# Patient Record
Sex: MALE | Race: WHITE | NOT HISPANIC OR LATINO
[De-identification: names, ages, dates, MRNs, and addresses within clinical notes are randomized per-mention and may not be internally consistent; named-entity substitution may affect disease eponyms.]

---

## 2020-09-23 ENCOUNTER — APPOINTMENT (OUTPATIENT)
Dept: ORTHOPEDIC SURGERY | Facility: CLINIC | Age: 25
End: 2020-09-23
Payer: COMMERCIAL

## 2020-09-23 VITALS
HEART RATE: 66 BPM | SYSTOLIC BLOOD PRESSURE: 118 MMHG | HEIGHT: 78 IN | BODY MASS INDEX: 28.93 KG/M2 | DIASTOLIC BLOOD PRESSURE: 76 MMHG | WEIGHT: 250 LBS

## 2020-09-23 DIAGNOSIS — Z78.9 OTHER SPECIFIED HEALTH STATUS: ICD-10-CM

## 2020-09-23 PROBLEM — Z00.00 ENCOUNTER FOR PREVENTIVE HEALTH EXAMINATION: Status: ACTIVE | Noted: 2020-09-23

## 2020-09-23 PROCEDURE — 99203 OFFICE O/P NEW LOW 30 MIN: CPT

## 2020-09-23 PROCEDURE — 73564 X-RAY EXAM KNEE 4 OR MORE: CPT | Mod: RT

## 2020-09-23 RX ORDER — IBUPROFEN 200 MG/1
200 TABLET, COATED ORAL
Refills: 0 | Status: ACTIVE | COMMUNITY

## 2020-09-23 NOTE — ASSESSMENT
[FreeTextEntry1] : 25-year-old with severe RIGHT knee pain in the last day and that seems to be getting better now. He had taken some ibuprofen. There wasn't a specific injury.Given his exam at the time of that visit I could not identify a clear etiology for his pain. There is no swelling in the joint and there is good motion in the knee feels stable. We'll have to see if he has a recurrent episode of pain. If he has any ongoing Pain or if he has repeated episodes of this severe pain then he should get an MRI to evaluate further Sometimes acute pain may just be from some patella maltracking or an acute inflammation that resolves.\par If he gets MRI I will call him.\par We also talked about running. He is relatively large, tall to be a runner. He should do cross training and biking ideally. At recommended doing more strengthening exercises in the lower extremity and some stretching. He was given a prescription to do physical therapy. He can take some ibuprofen and ice for the next few days. If the pain is ongoing he should get the MRI and I will call him with the results.

## 2020-09-23 NOTE — HISTORY OF PRESENT ILLNESS
[de-identified] : Leonel is a 25-year-old who presents with pain in his RIGHT knee He's had some very minor low grade discomfort associated with running and his knee that may be less than a 1/10. Then he woke up yesterday with pain that was a 4/10 in his knee on the lateral side. Pain got progressively worse and he had some difficulty walking. He went to work and was active through the day and it had episodes where it became much more severe. He was able to drive home but then could hardly walk. Keeping his knees straight seemed to make it feel a little better. He had difficulty bending the knee. He took 2 Advil last night and then another 2 this morning and his knee is improving. When he went to bed last night the pain was an 8-9/10 but then this morning it was a 4/10 and right now he just has some mild discomfort.\par no prior knee injuries. There was no particular injury that occurred with his knee.

## 2020-09-23 NOTE — PHYSICAL EXAM
[LE] : Sensory: Intact in bilateral lower extremities [DP] : dorsalis pedis 2+ and symmetric bilaterally [PT] : posterior tibial 2+ and symmetric bilaterally [Normal RLE] : Right Lower Extremity: No scars, rashes, lesions, ulcers, skin intact [Normal LLE] : Left Lower Extremity: No scars, rashes, lesions, ulcers, skin intact [Normal Touch] : sensation intact for touch [Normal] : Gait: normal [Obese] : not obese [de-identified] : Knees:\par Nonantalgic gait. he can squat fully and just feels a slight anterolateral knee discomfort on the RIGHT\par No effusion.\par - erythema, edema, warmth.\par No significant tenderness joint lines her patella facets were along the ITB.\par ROM: 0 degrees extension to 135 degrees flexion. Slight pain RIGHT knee on full flexion. No crepitus\par - Eriberto.\par 1A Lachman.  - Pivot shift. - posterior drawer. Normal rotational, varus/valgus laxity.\par Intact extensor mechanism.\par NVI distally.\par  [de-identified] : mildly overweight with a BMI of 29 [de-identified] : No respiratory distress or cough  [de-identified] : \par x-rays knee weightbearing 4 views weightbearing today show no acute changes such as fractures. No osteoarthritis. There is a very small oval density on the lateral tibial plateau of uncertain significance just seen on the AP views. No loose bodies

## 2020-10-12 ENCOUNTER — APPOINTMENT (OUTPATIENT)
Dept: ORTHOPEDIC SURGERY | Facility: CLINIC | Age: 25
End: 2020-10-12
Payer: COMMERCIAL

## 2020-10-12 PROCEDURE — 73560 X-RAY EXAM OF KNEE 1 OR 2: CPT | Mod: RT

## 2020-10-12 PROCEDURE — 99214 OFFICE O/P EST MOD 30 MIN: CPT

## 2020-10-12 NOTE — PHYSICAL EXAM
[Slightly Antalgic] : slightly antalgic [Obese] : not obese [de-identified] : Knees:\par he can stand and walk a few steps without any significant pain. \par No effusion.\par - erythema, edema, warmth.\par there is mild tenderness mid lateral tibial plateau but not anterior. No joint line tenderness laterally or medially.\par ROM: 0 degrees extension to 130 degrees flexion. Slight pain RIGHT knee on full flexion. No crepitus\par - Eriberto.\par 1A Lachman.  - Pivot shift. - posterior drawer. Normal rotational, varus/valgus laxity.\par Intact extensor mechanism.\par NVI distally.\par  [de-identified] : mildly overweight with a BMI of 29 [de-identified] : No respiratory distress or cough  [de-identified] : \par MRI of the RIGHT knee performed October 9, 2020 shows extensive fluid in the lateral aspect of the tibial plateau diffusely. There is an area in the central portion of the tibial plateau that looks like a possible 3 mm osteochondral lesion. there is a 3 mm hypointense signal centrally which did correspond to the x-ray finding from 2 weeks ago with a dense lesion seen in the lateral tibial plateau At that time the significance of this was not appreciated that may be the area of a small osteochondral lesion\par No definite meniscus tear but there is some partial signal in the very posterior horn of the lateral meniscus and intrasubstance signal in the medial meniscus.No ligament tears\par \par Repeat AP and lateral x-rays today of the RIGHT knee no longer showed that the radiodense lesion in the lateral tibial plateau seen initially and what seem to correspond to the lesion seen on the MRI. Views  are quite similar

## 2020-10-12 NOTE — ASSESSMENT
[FreeTextEntry1] : 25-year-old with RIGHT knee pain the variable degree over the last few weeks that got much worse again at the end of last week. MRI shows a stress fracture/insufficiency fracture in the lateral tibial plateau region. There appears to be a small, 3mm, osteochondral lesion Central tibial plateau.  This corresponded to a radiodense lesion seen on the x-ray last visit but is not seen on x-rays today.His is unusual with his history and age. There was no trauma. There was no sudden increase in activity.\par given Shannon's usual his history is I've recommended getting a DEXA scan to rule out osteoporosis which seems quite unlikely in someone young and healthy like himself. I will call him with the results.\par he should do straight leg raises/non-weightbearing exercise but no weightbearing exercise right now and limit the walking and always use crutches or a walker.He does not need to take any ibuprofen or medication which may just mask his pain. He should followup in about 3 weeks to check on his progress.

## 2020-10-12 NOTE — HISTORY OF PRESENT ILLNESS
[de-identified] : Leonel comes in for followup for his RIGHT knee. It's been painful on and off for the last several weeks after I saw him 2 weeks ago when it was feeling better. It got much worse and he went for the MRI scan on Friday which showed an insufficiency/Stress fracture in the lateral tibial plateau.\par I spoke to him on the phone Friday. He was using a walker and I suggested that he come in and continue resting. He again denied any history of trauma. He had been running up to about a 3 mile run a few weeks before the pain started. He had no run within 2 weeks of developing the pain so it didn't seem like that caused the pain. When running he would occasionally have a little bit of very mild pain in either knee perhaps a little more on the RIGHT than LEFT. He never had any twisting injuries or falls.\par He's never had a fracture before.\par His knee pain today is much less than it was 3 days ago. The rest over the weekend and not walking very much and using a walker really helped. He came to the office without the walker but just walked out from the car.\par There is no swelling or locking or buckling. He had taken some ibuprofen.

## 2020-10-12 NOTE — PROCEDURE
[de-identified] : \par He was fitted for her crutches and shown how deep the walking partial weightbearing

## 2020-11-02 ENCOUNTER — APPOINTMENT (OUTPATIENT)
Dept: ORTHOPEDIC SURGERY | Facility: CLINIC | Age: 25
End: 2020-11-02
Payer: COMMERCIAL

## 2020-11-02 PROCEDURE — 99072 ADDL SUPL MATRL&STAF TM PHE: CPT

## 2020-11-02 PROCEDURE — 99214 OFFICE O/P EST MOD 30 MIN: CPT

## 2020-11-02 PROCEDURE — 73560 X-RAY EXAM OF KNEE 1 OR 2: CPT | Mod: RT

## 2020-11-02 NOTE — HISTORY OF PRESENT ILLNESS
[de-identified] : Leonel comes in for followup for his RIGHT knee.  He used crutches and wore the brace for a couple weeks and then stopped because his knee was feeling progressively better. Saline taking it easy and not walking anywhere of any significance. 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 He is getting  this weekend and would like to be able to dance. He is wondering if his knee is healed yet or not.\par He did not go for a bone density test which was ordered last visit. He's been taking vitamin D.

## 2020-11-02 NOTE — PHYSICAL EXAM
[Normal] : Gait: normal [Obese] : not obese [de-identified] : Knees:\par He can walk short distances the examination room without pain or difficulty \par No effusion.\par - erythema, edema, warmth.\par Nontender joint line medial and lateral. Nontender patella facets. Nontender tibia\par ROM: 0 degrees extension to 135 degrees flexion no longer with pain RIGHT knee on full flexion. No crepitus\par - Eriberto.\par 1A Lachman.  - Pivot shift. - posterior drawer. Normal rotational, varus/valgus laxity.\par Intact extensor mechanism.\par NVI distally.\par  [de-identified] : mildly overweight with a BMI of 29 [de-identified] : No respiratory distress or cough  [de-identified] : \par MRI of the RIGHT knee performed October 9, 2020 shows extensive fluid in the lateral aspect of the tibial plateau diffusely. There is an area in the central portion of the tibial plateau that looks like a possible 3 mm osteochondral lesion. there is a 3 mm hypointense signal centrally which did correspond to the x-ray finding from 2 weeks ago with a dense lesion seen in the lateral tibial plateau At that time the significance of this was not appreciated that may be the area of a small osteochondral lesion\par No definite meniscus tear but there is some partial signal in the very posterior horn of the lateral meniscus and intrasubstance signal in the medial meniscus.No ligament tears\par \par Repeat AP right knee x-ray today is unremarkable and no longer shows that the radiodense lesion in the lateral tibial plateau seen on initial x-ray

## 2020-11-02 NOTE — ASSESSMENT
[FreeTextEntry1] : 25-year-old with RIGHT knee pain with MRI showing a stress fracture/insufficiency fracture in the lateral tibial plateau region. There appears to be a small, 3 mm, osteochondral lesion central tibial plateau.  \par clinically his knee is improving. Given that it's only been 3 weeks and given the extent of the edema in the lateral tibia, I would assume that his knee has not healed to the degree that I would allow him to do any impact exercises, running, jumping or dancing. He can ride a bike on no work very low assistance. He should do straight leg raises, nonweightbearing exercises.\par He should get a bone density test, DEXA scan before the next visit. I was concerned about the extent of the insufficiency fracture given the lack of any history of trauma and his young age.\par He is not having any mechanical symptoms in his knee at this time. There may be a 3 mm chondral injury.\par In order to determine if there is any healing occurring and if the bone marrow edema has resolved I referred him for a followup MRI.\par Followup in 3-4 weeks.

## 2020-12-01 PROBLEM — M94.9 CHONDRAL LESION: Status: ACTIVE | Noted: 2020-10-12

## 2020-12-01 PROBLEM — M84.469D INSUFFICIENCY FRACTURE OF TIBIA WITH ROUTINE HEALING, SUBSEQUENT ENCOUNTER: Status: ACTIVE | Noted: 2020-10-12

## 2020-12-02 ENCOUNTER — APPOINTMENT (OUTPATIENT)
Dept: ORTHOPEDIC SURGERY | Facility: CLINIC | Age: 25
End: 2020-12-02
Payer: COMMERCIAL

## 2020-12-02 DIAGNOSIS — M25.561 PAIN IN RIGHT KNEE: ICD-10-CM

## 2020-12-02 DIAGNOSIS — M84.469D PATHOLOGICAL FRACTURE, UNSPECIFIED TIBIA AND FIBULA, SUBSEQUENT ENCOUNTER FOR FRACTURE WITH ROUTINE HEALING: ICD-10-CM

## 2020-12-02 DIAGNOSIS — M25.562 PAIN IN LEFT KNEE: ICD-10-CM

## 2020-12-02 DIAGNOSIS — M94.9 DISORDER OF CARTILAGE, UNSPECIFIED: ICD-10-CM

## 2020-12-02 PROCEDURE — 99072 ADDL SUPL MATRL&STAF TM PHE: CPT

## 2020-12-02 PROCEDURE — 99214 OFFICE O/P EST MOD 30 MIN: CPT

## 2020-12-02 NOTE — PHYSICAL EXAM
Daytime:  (do not instill while wearing contact lenses)  Soothe XP  Systane Balance  Refresh Optive Advanced    During contact lens wear:  Systane Contacts  Refresh Contacts     Night time-before bed:  Drops:  Refresh Liquigel  Systane Gel Drops  Ointment:  Soothe Night Time ointment  Systane Nighttime ointment  Refresh PM ointment       Thermalon Dry Eye Compress    The Thermalon dry eye compress is designed to offer a soothing solution to dry eye problems without messy washcloths or dripping water. These compresses work by applying microwave-activated moist heat to the eyes which refreshes them and relieves some of the discomfort that comes alongside this condition. Many experts recommend the Thermalon as a natural and helpful way to offer relief from dry eyes. All you need to do to use this product is place it in the microwave oven for the recommended time and apply. The warmth of the compress assists in unplugging blocked oil glands, freeing some of the oil in the glands to slow down evaporative tear loss.  This compress is reusable and washable. It works because it contains a patented substance known as Hydro Pearls, which have the job of continuously absorbing and storing water molecules from the air. When this product is placed in the microwave oven, it is encouraged to release hygienic and clean moist heat that works perfectly for dry eye syndrome. There’s no need to add any water, which means that mess can be avoided, and fast acting dry eye relief can be achieved.  The Thermalon dry eye compress makes treating dry eye syndrome convenient and simple for many people, as it heats quickly within a standard microwave and provides soothing heat to blocked glands that help to keep your eyes hydrated and refreshed without the use of medication. It is recommended that you don’t attempt to heat the pack for more than 30 seconds, as 15 seconds of heat is often enough for most microwave ovens.  One of the most  significant benefits of the Thermalon compress is that it is regarded to be one of the most effective and safe solutions on the market today. The pearls within the dry eye compress will not support the growth of fungi and bacteria like other herbal and grain products that can be found in some stores, and they are often easier to use than gel-related products. It’s recommended that your Thermalon compress be replaced by a new one every six months if you use it regularly.  Available at:     [facility], [address] [place] [address] [id number] [place] [address]     Dry Eyes     When you have a lack of tears, this is known as dry eyes. Your eyes need tears to keep them lubricated and to help wash away foreign particles. Burning, scratching, stinging, or excessive tearing are all signs of dry eyes.       Common Causes:  · Aging  · Dry environment  · Smoking/second-hand smoke exposure  · Eye injuries and other eye problems  · Sjogren’s syndrome  · Cold and allergy medications  · Previous eye surgery     You can try using over the counter artificial tears. Thicker drops last longer, but can cause blurry vision for a short time after instillation.  Using the artificial tears on a regular basis, instead of using then just as symptoms arise, will provide the most benefit.  Try to avoid smoke and direct wind. Use a humidifier, especially in winter. When possible, stop allergy or cold medicines if that is determined to be causing your symptoms.       Please feel free to call our office,     Elton Walker, OD  [phone]        [Slightly Antalgic] : slightly antalgic [LE] : Sensory: Intact in bilateral lower extremities [DP] : dorsalis pedis 2+ and symmetric bilaterally [PT] : posterior tibial 2+ and symmetric bilaterally [Normal RLE] : Right Lower Extremity: No scars, rashes, lesions, ulcers, skin intact [Normal LLE] : Left Lower Extremity: No scars, rashes, lesions, ulcers, skin intact [Normal Touch] : sensation intact for touch [Normal] : Oriented to person, place, and time, insight and judgement were intact and the affect was normal [Obese] : not obese [de-identified] : Knees:\par nonantalgic gait. He can squat without pain. Normal alignment\par No effusion.\par - erythema, edema, warmth.\par Nontender joint line medial and lateral. Nontender patella facets. Nontender tibia\par ROM: 0 degrees extension to 135 degrees flexion  without pain. No crepitus\par - Eriberto.\par 1A Lachman.  - Pivot shift. - posterior drawer. Normal rotational, varus/valgus laxity.\par Intact extensor mechanism.\par NVI distally.\par  [de-identified] : mildly overweight with a BMI of 29 [de-identified] : No respiratory distress or cough  [de-identified] : \par No new x-rays today. If he has ongoing pain in his LEFT knee next visit we will get x-rays but the exam was completely unremarkable\par \par MRI of the RIGHT knee performed October 9, 2020 shows extensive fluid in the lateral aspect of the tibial plateau diffusely. There is an area in the central portion of the tibial plateau that looks like a possible 3 mm osteochondral lesion. there is a 3 mm hypointense signal centrally which did correspond to the x-ray finding from 2 weeks ago with a dense lesion seen in the lateral tibial plateau At that time the significance of this was not appreciated that may be the area of a small osteochondral lesion\par No definite meniscus tear but there is some partial signal in the very posterior horn of the lateral meniscus and intrasubstance signal in the medial meniscus.No ligament tears\par \par Repeat AP right knee x-ray 11/2/20 was unremarkable and no longer shows that the radiodense lesion in the lateral tibial plateau seen on initial x-ray

## 2020-12-02 NOTE — HISTORY OF PRESENT ILLNESS
[de-identified] : Leonel comes in for followup for his RIGHT knee which is feeling very good. \par He has not been having any pain in his knee at all in the last month. He did get . He just did a little bit of dancing. On a few occasions he jogged a few steps without pain but has really taken it easy. He hasn't been doing any of the physical therapy exercises. No swelling or locking or buckling in the knee.\par He did not go for a bone density test which was ordered last visit. He's been taking vitamin D.\par He did get a little bit of pain in his contralateral LEFT knee recently but nothing like the sharp pains he was having on the RIGHT side. No swelling.

## 2020-12-02 NOTE — ASSESSMENT
[FreeTextEntry1] : 25-year-old with RIGHT knee pain with MRI showing a stress fracture/insufficiency fracture in the lateral tibial plateau region. There appeared to be a small, 3 mm, osteochondral lesion central tibial plateau.  \par clinically his knee is improving. \par At this point he hasn't had any pain in about a month. He has a little bit of intermittent soreness in his contralateral knee which may be patellofemoral and compensatory. If that gets worse we will work it up further with x-rays and an MRI if needed.\par If the pain in the RIGHT knee comes back we will get an MRI to make sure the insufficiency fracture on 10 chondral injury has healed.\par He will resume physical therapy and we've reviewed exercises that he should be doing to strengthen around both knees. Slowly increase activity as tolerated. Stationary bike or elliptical would be good for exercise as opposed to running right now.\par Followup if he has any recurrent pain or issues.

## 2022-01-12 ENCOUNTER — RESULT REVIEW (OUTPATIENT)
Age: 27
End: 2022-01-12

## 2022-01-12 ENCOUNTER — OUTPATIENT (OUTPATIENT)
Dept: OUTPATIENT SERVICES | Facility: HOSPITAL | Age: 27
LOS: 1 days | End: 2022-01-12
Payer: COMMERCIAL

## 2022-01-12 ENCOUNTER — APPOINTMENT (OUTPATIENT)
Dept: ORTHOPEDIC SURGERY | Facility: CLINIC | Age: 27
End: 2022-01-12
Payer: COMMERCIAL

## 2022-01-12 VITALS
HEIGHT: 78 IN | WEIGHT: 245 LBS | DIASTOLIC BLOOD PRESSURE: 80 MMHG | SYSTOLIC BLOOD PRESSURE: 120 MMHG | BODY MASS INDEX: 28.35 KG/M2

## 2022-01-12 DIAGNOSIS — R26.81 UNSTEADINESS ON FEET: ICD-10-CM

## 2022-01-12 DIAGNOSIS — R20.2 PARESTHESIA OF SKIN: ICD-10-CM

## 2022-01-12 PROCEDURE — 72082 X-RAY EXAM ENTIRE SPI 2/3 VW: CPT

## 2022-01-12 PROCEDURE — 72100 X-RAY EXAM L-S SPINE 2/3 VWS: CPT

## 2022-01-12 PROCEDURE — 99214 OFFICE O/P EST MOD 30 MIN: CPT

## 2022-01-12 PROCEDURE — 72084 X-RAY EXAM ENTIRE SPI 6/> VW: CPT | Mod: 26

## 2022-01-12 PROCEDURE — 72084 X-RAY EXAM ENTIRE SPI 6/> VW: CPT

## 2022-01-12 NOTE — DISCUSSION/SUMMARY
[de-identified] : DIscussed the results of the patient's history, physical exam, and imaging. L4/5 disc herniation with back and leg parasthesias.  leg parasthesias resolved, back pain much improved.  We discussed the importance of improving core strength and flexibility, and a prescription for physical therapy was given. Spoke to patient extensively about exercise, avoiding heavy lifting and exercises that put pressure on lower back. Cervical MRI script ordered for balance problems and hand numbness. The patient will follow up with me virtually after MRI. All questions were answered.

## 2022-01-12 NOTE — HISTORY OF PRESENT ILLNESS
[de-identified] : Mr. BAKER is a very pleasant 26 year old male who complains of LLQ and RLQ abdominal / bilateral leg numbness x 1 months. Numbness has been gradually improving, has recently begun exercise again. Feels 85% better today than 1 month ago. Reports 1 experience in which pt had urge to defecate and due to numbness could not reach restroom in time. Denies neck pain, shooting pain down extremities. Denies falls. Reports occasional pinky finger numbness.\par \par The patient reports no loss of hand dexterity.\par The patient states there is gait instability.\par The patient reports no difficulty with urination.\par \par The patient reports no history of previous spine surgery.\par \par The patient has no history of unexpected weight loss, no history of active cancer, no history bladder or bowel dysfunction, no night pain, no fevers or chills.\par \par The past medical history, surgical history, family history, allergies, medications, review of systems, family history and social history were reviewed and non-contributory.\par

## 2022-01-12 NOTE — PHYSICAL EXAM
[de-identified] : Physical exam:\par \par General: patient is well developed, well nourished, in no acute distress, alert and oriented x 3.\par \par Mood and affect: normal\par \par Respiratory: no respiratory distress noted\par \par Skin: no scars over spine, skin intact, no erythema, increased warmth\par \par Alignment: The spine is well compensated in the coronal and sagittal plane.\par \par Gait: The patient is able to toe walk and heel walk without difficulty. The patient is able to tandem gait with mild difficulty.\par \par Palpation: no tenderness to palpation spine or paraspinal region.\par \par Range of motion: Lumbar spine ROM is full.\par \par Neurological Exam:\par Motor: Manual Muscle testing in the upper and lower extremities is 5 out of 5 in all muscle groups.There is no evidence of muscle atrophy in the upper extremities. Sensory: Sensation to light touch is grossly intact in the upper and lower extremities.\par \par Reflexes: DTR are present and symmetric throughout, no clonus, plantar responses are flexor.\par \par Special tests: Straight Leg Raise Negative. Cross Straight Leg Raise Negative, [facility] Test Negative.\par \par Hip Exam: Full painless ROM of bilateral hips\par \par Vascular: Examination of the peripheral vascular system demonstrates no evidence of congestion or edema. no lymphedema bilateral lower extremities, pulses are present and symmetric in both lower extremities. [de-identified] : 1/12/22: Full spinal XR demonstrates mild scoliosis upper thoracic spine, mild shoulder asymmetry, right lower than left, right hemipelvis lower than left, sagittal full spine shows normal alignment, flexion extension views of lumbar spine show mild retro listhesis L5/S1, mild to moderate disc degeneration at L4/L5 and mild at L5/S1, no fractures or instability.\par \par 12/13/21 MRI: L4/L5 central disc herniation with severe lateral recess stenosis on right, mild to moderate on left at this level. No significant foraminal stenosis at this level, L5/S1 shows small central disc herniation with retro listhesis at this level. Mild foraminal stenosis at L5/S1 bilaterally. Other levels appear radiographically normal.

## 2022-01-12 NOTE — ADDENDUM
[FreeTextEntry1] : I, Arturo Duran acting as a scribe for Dr. Neri Smith MD on 01/12/2022 at 9:07 AM.\par